# Patient Record
Sex: FEMALE | Race: WHITE | NOT HISPANIC OR LATINO | URBAN - METROPOLITAN AREA
[De-identification: names, ages, dates, MRNs, and addresses within clinical notes are randomized per-mention and may not be internally consistent; named-entity substitution may affect disease eponyms.]

---

## 2022-11-01 ENCOUNTER — EMERGENCY (EMERGENCY)
Facility: HOSPITAL | Age: 37
LOS: 1 days | Discharge: HOME | End: 2022-11-01
Attending: EMERGENCY MEDICINE | Admitting: EMERGENCY MEDICINE
Payer: SELF-PAY

## 2022-11-01 VITALS
HEART RATE: 98 BPM | SYSTOLIC BLOOD PRESSURE: 118 MMHG | OXYGEN SATURATION: 98 % | TEMPERATURE: 101 F | RESPIRATION RATE: 16 BRPM | HEIGHT: 69 IN | WEIGHT: 194.01 LBS | DIASTOLIC BLOOD PRESSURE: 79 MMHG

## 2022-11-01 DIAGNOSIS — B37.9 CANDIDIASIS, UNSPECIFIED: ICD-10-CM

## 2022-11-01 DIAGNOSIS — Z88.0 ALLERGY STATUS TO PENICILLIN: ICD-10-CM

## 2022-11-01 DIAGNOSIS — Z87.440 PERSONAL HISTORY OF URINARY (TRACT) INFECTIONS: ICD-10-CM

## 2022-11-01 DIAGNOSIS — Z86.19 PERSONAL HISTORY OF OTHER INFECTIOUS AND PARASITIC DISEASES: ICD-10-CM

## 2022-11-01 DIAGNOSIS — Z20.822 CONTACT WITH AND (SUSPECTED) EXPOSURE TO COVID-19: ICD-10-CM

## 2022-11-01 DIAGNOSIS — N39.0 URINARY TRACT INFECTION, SITE NOT SPECIFIED: ICD-10-CM

## 2022-11-01 DIAGNOSIS — A60.00 HERPESVIRAL INFECTION OF UROGENITAL SYSTEM, UNSPECIFIED: ICD-10-CM

## 2022-11-01 DIAGNOSIS — R30.0 DYSURIA: ICD-10-CM

## 2022-11-01 DIAGNOSIS — Z79.85 LONG-TERM (CURRENT) USE OF INJECTABLE NON-INSULIN ANTIDIABETIC DRUGS: ICD-10-CM

## 2022-11-01 LAB
ALBUMIN SERPL ELPH-MCNC: 4.8 G/DL — SIGNIFICANT CHANGE UP (ref 3.3–5)
ALP SERPL-CCNC: 134 U/L — HIGH (ref 40–120)
ALT FLD-CCNC: 179 U/L — HIGH (ref 10–45)
ANION GAP SERPL CALC-SCNC: 15 MMOL/L — SIGNIFICANT CHANGE UP (ref 5–17)
APPEARANCE UR: CLEAR — SIGNIFICANT CHANGE UP
APTT BLD: 32.3 SEC — SIGNIFICANT CHANGE UP (ref 27.5–35.5)
AST SERPL-CCNC: 408 U/L — HIGH (ref 10–40)
BASOPHILS # BLD AUTO: 0.02 K/UL — SIGNIFICANT CHANGE UP (ref 0–0.2)
BASOPHILS NFR BLD AUTO: 0.2 % — SIGNIFICANT CHANGE UP (ref 0–2)
BILIRUB SERPL-MCNC: 0.6 MG/DL — SIGNIFICANT CHANGE UP (ref 0.2–1.2)
BILIRUB UR-MCNC: NEGATIVE — SIGNIFICANT CHANGE UP
BUN SERPL-MCNC: 7 MG/DL — SIGNIFICANT CHANGE UP (ref 7–23)
CALCIUM SERPL-MCNC: 9.7 MG/DL — SIGNIFICANT CHANGE UP (ref 8.4–10.5)
CHLORIDE SERPL-SCNC: 96 MMOL/L — SIGNIFICANT CHANGE UP (ref 96–108)
CO2 SERPL-SCNC: 27 MMOL/L — SIGNIFICANT CHANGE UP (ref 22–31)
COLOR SPEC: YELLOW — SIGNIFICANT CHANGE UP
CREAT SERPL-MCNC: 0.62 MG/DL — SIGNIFICANT CHANGE UP (ref 0.5–1.3)
DIFF PNL FLD: ABNORMAL
EGFR: 118 ML/MIN/1.73M2 — SIGNIFICANT CHANGE UP
EOSINOPHIL # BLD AUTO: 0.01 K/UL — SIGNIFICANT CHANGE UP (ref 0–0.5)
EOSINOPHIL NFR BLD AUTO: 0.1 % — SIGNIFICANT CHANGE UP (ref 0–6)
GLUCOSE SERPL-MCNC: 128 MG/DL — HIGH (ref 70–99)
GLUCOSE UR QL: NEGATIVE — SIGNIFICANT CHANGE UP
HCG UR QL: NEGATIVE — SIGNIFICANT CHANGE UP
HCT VFR BLD CALC: 40 % — SIGNIFICANT CHANGE UP (ref 34.5–45)
HGB BLD-MCNC: 13.5 G/DL — SIGNIFICANT CHANGE UP (ref 11.5–15.5)
HIV 1+2 AB+HIV1 P24 AG SERPL QL IA: SIGNIFICANT CHANGE UP
IMM GRANULOCYTES NFR BLD AUTO: 0.4 % — SIGNIFICANT CHANGE UP (ref 0–0.9)
INR BLD: 1.17 — HIGH (ref 0.88–1.16)
KETONES UR-MCNC: ABNORMAL MG/DL
LEUKOCYTE ESTERASE UR-ACNC: ABNORMAL
LYMPHOCYTES # BLD AUTO: 1.6 K/UL — SIGNIFICANT CHANGE UP (ref 1–3.3)
LYMPHOCYTES # BLD AUTO: 19.2 % — SIGNIFICANT CHANGE UP (ref 13–44)
MCHC RBC-ENTMCNC: 32.1 PG — SIGNIFICANT CHANGE UP (ref 27–34)
MCHC RBC-ENTMCNC: 33.8 GM/DL — SIGNIFICANT CHANGE UP (ref 32–36)
MCV RBC AUTO: 95 FL — SIGNIFICANT CHANGE UP (ref 80–100)
MONOCYTES # BLD AUTO: 0.91 K/UL — HIGH (ref 0–0.9)
MONOCYTES NFR BLD AUTO: 10.9 % — SIGNIFICANT CHANGE UP (ref 2–14)
NEUTROPHILS # BLD AUTO: 5.78 K/UL — SIGNIFICANT CHANGE UP (ref 1.8–7.4)
NEUTROPHILS NFR BLD AUTO: 69.2 % — SIGNIFICANT CHANGE UP (ref 43–77)
NITRITE UR-MCNC: NEGATIVE — SIGNIFICANT CHANGE UP
NRBC # BLD: 0 /100 WBCS — SIGNIFICANT CHANGE UP (ref 0–0)
PH UR: 6 — SIGNIFICANT CHANGE UP (ref 5–8)
PLATELET # BLD AUTO: 162 K/UL — SIGNIFICANT CHANGE UP (ref 150–400)
POTASSIUM SERPL-MCNC: 3.9 MMOL/L — SIGNIFICANT CHANGE UP (ref 3.5–5.3)
POTASSIUM SERPL-SCNC: 3.9 MMOL/L — SIGNIFICANT CHANGE UP (ref 3.5–5.3)
PROT SERPL-MCNC: 8 G/DL — SIGNIFICANT CHANGE UP (ref 6–8.3)
PROT UR-MCNC: 30 MG/DL
PROTHROM AB SERPL-ACNC: 14 SEC — HIGH (ref 10.5–13.4)
RBC # BLD: 4.21 M/UL — SIGNIFICANT CHANGE UP (ref 3.8–5.2)
RBC # FLD: 13.2 % — SIGNIFICANT CHANGE UP (ref 10.3–14.5)
SARS-COV-2 RNA SPEC QL NAA+PROBE: NEGATIVE — SIGNIFICANT CHANGE UP
SODIUM SERPL-SCNC: 138 MMOL/L — SIGNIFICANT CHANGE UP (ref 135–145)
SP GR SPEC: >=1.03 — SIGNIFICANT CHANGE UP (ref 1–1.03)
UROBILINOGEN FLD QL: 0.2 E.U./DL — SIGNIFICANT CHANGE UP
WBC # BLD: 8.35 K/UL — SIGNIFICANT CHANGE UP (ref 3.8–10.5)
WBC # FLD AUTO: 8.35 K/UL — SIGNIFICANT CHANGE UP (ref 3.8–10.5)

## 2022-11-01 PROCEDURE — 74176 CT ABD & PELVIS W/O CONTRAST: CPT | Mod: MA

## 2022-11-01 PROCEDURE — 85025 COMPLETE CBC W/AUTO DIFF WBC: CPT

## 2022-11-01 PROCEDURE — 99284 EMERGENCY DEPT VISIT MOD MDM: CPT | Mod: 25

## 2022-11-01 PROCEDURE — 87086 URINE CULTURE/COLONY COUNT: CPT

## 2022-11-01 PROCEDURE — 87591 N.GONORRHOEAE DNA AMP PROB: CPT

## 2022-11-01 PROCEDURE — 36415 COLL VENOUS BLD VENIPUNCTURE: CPT

## 2022-11-01 PROCEDURE — 96374 THER/PROPH/DIAG INJ IV PUSH: CPT

## 2022-11-01 PROCEDURE — 85610 PROTHROMBIN TIME: CPT

## 2022-11-01 PROCEDURE — 80074 ACUTE HEPATITIS PANEL: CPT

## 2022-11-01 PROCEDURE — 86780 TREPONEMA PALLIDUM: CPT

## 2022-11-01 PROCEDURE — 85730 THROMBOPLASTIN TIME PARTIAL: CPT

## 2022-11-01 PROCEDURE — 87389 HIV-1 AG W/HIV-1&-2 AB AG IA: CPT

## 2022-11-01 PROCEDURE — 81025 URINE PREGNANCY TEST: CPT

## 2022-11-01 PROCEDURE — 80053 COMPREHEN METABOLIC PANEL: CPT

## 2022-11-01 PROCEDURE — 81001 URINALYSIS AUTO W/SCOPE: CPT

## 2022-11-01 PROCEDURE — 99285 EMERGENCY DEPT VISIT HI MDM: CPT

## 2022-11-01 PROCEDURE — 87491 CHLMYD TRACH DNA AMP PROBE: CPT

## 2022-11-01 PROCEDURE — 96375 TX/PRO/DX INJ NEW DRUG ADDON: CPT

## 2022-11-01 PROCEDURE — 87635 SARS-COV-2 COVID-19 AMP PRB: CPT

## 2022-11-01 PROCEDURE — 74176 CT ABD & PELVIS W/O CONTRAST: CPT | Mod: 26,MA

## 2022-11-01 RX ORDER — CEFTRIAXONE 500 MG/1
1000 INJECTION, POWDER, FOR SOLUTION INTRAMUSCULAR; INTRAVENOUS ONCE
Refills: 0 | Status: COMPLETED | OUTPATIENT
Start: 2022-11-01 | End: 2022-11-01

## 2022-11-01 RX ORDER — CEFPODOXIME PROXETIL 100 MG
1 TABLET ORAL
Qty: 20 | Refills: 0
Start: 2022-11-01 | End: 2022-11-10

## 2022-11-01 RX ORDER — KETOROLAC TROMETHAMINE 30 MG/ML
15 SYRINGE (ML) INJECTION ONCE
Refills: 0 | Status: DISCONTINUED | OUTPATIENT
Start: 2022-11-01 | End: 2022-11-01

## 2022-11-01 RX ORDER — ACETAMINOPHEN 500 MG
650 TABLET ORAL ONCE
Refills: 0 | Status: COMPLETED | OUTPATIENT
Start: 2022-11-01 | End: 2022-11-01

## 2022-11-01 RX ORDER — SODIUM CHLORIDE 9 MG/ML
1000 INJECTION INTRAMUSCULAR; INTRAVENOUS; SUBCUTANEOUS ONCE
Refills: 0 | Status: COMPLETED | OUTPATIENT
Start: 2022-11-01 | End: 2022-11-01

## 2022-11-01 RX ORDER — FLUCONAZOLE 150 MG/1
150 TABLET ORAL ONCE
Refills: 0 | Status: COMPLETED | OUTPATIENT
Start: 2022-11-01 | End: 2022-11-01

## 2022-11-01 RX ORDER — VALACYCLOVIR 500 MG/1
1000 TABLET, FILM COATED ORAL ONCE
Refills: 0 | Status: COMPLETED | OUTPATIENT
Start: 2022-11-01 | End: 2022-11-01

## 2022-11-01 RX ORDER — VALACYCLOVIR 500 MG/1
1 TABLET, FILM COATED ORAL
Qty: 20 | Refills: 0
Start: 2022-11-01 | End: 2022-11-10

## 2022-11-01 RX ADMIN — FLUCONAZOLE 150 MILLIGRAM(S): 150 TABLET ORAL at 22:20

## 2022-11-01 RX ADMIN — CEFTRIAXONE 100 MILLIGRAM(S): 500 INJECTION, POWDER, FOR SOLUTION INTRAMUSCULAR; INTRAVENOUS at 22:47

## 2022-11-01 RX ADMIN — Medication 650 MILLIGRAM(S): at 20:34

## 2022-11-01 RX ADMIN — VALACYCLOVIR 1000 MILLIGRAM(S): 500 TABLET, FILM COATED ORAL at 22:47

## 2022-11-01 RX ADMIN — Medication 15 MILLIGRAM(S): at 22:20

## 2022-11-01 RX ADMIN — SODIUM CHLORIDE 1000 MILLILITER(S): 9 INJECTION INTRAMUSCULAR; INTRAVENOUS; SUBCUTANEOUS at 20:34

## 2022-11-01 RX ADMIN — Medication 15 MILLIGRAM(S): at 23:00

## 2022-11-01 NOTE — ED ADULT NURSE NOTE - OBJECTIVE STATEMENT
Pt A&Ox4, ambulatory with steady gait, speaking in clear/full sentences, no acute distress, vital signs stable. Pt presents to the ED for urinary burning, "cottage cheese appearing vaginal discharge" and genital burning and pain. Pt also reports general malaise, loss of appetite and some fever at home. Pt also reports drinking "too much." Unable to state how much she drinks per week.

## 2022-11-01 NOTE — ED ADULT TRIAGE NOTE - CHIEF COMPLAINT QUOTE
Pt presents with multiple medical complaints. Reports she is currently being treated for a UTI, on antibiotics, and has been "peeing blood" x3 days. Also reports "lumps" to vaginal area, white discharge, and "too much malaise for me." Pt also states she now has pain "near my liver." Reports she has not eaten in two days. Pt speaking in full sentences.

## 2022-11-02 VITALS
DIASTOLIC BLOOD PRESSURE: 87 MMHG | SYSTOLIC BLOOD PRESSURE: 122 MMHG | HEART RATE: 82 BPM | TEMPERATURE: 99 F | RESPIRATION RATE: 17 BRPM | OXYGEN SATURATION: 99 %

## 2022-11-02 LAB
C TRACH RRNA SPEC QL NAA+PROBE: SIGNIFICANT CHANGE UP
N GONORRHOEA RRNA SPEC QL NAA+PROBE: SIGNIFICANT CHANGE UP
SPECIMEN SOURCE: SIGNIFICANT CHANGE UP
T PALLIDUM AB TITR SER: NEGATIVE — SIGNIFICANT CHANGE UP

## 2022-11-02 NOTE — ED PROVIDER NOTE - OBJECTIVE STATEMENT
37F with a h/o UTIs and yeast infections in the past, on suboxone, who p/w dysuria and blood in urine x 3 days, Reports recent unprotected intercourse and noted in whitish cottage cheese dc and painful "bumps" to vaginal area. Also reports flank pain and feels like her liver is enlarged. She recently started taking a weight loss injectable Mounjaro  Admits to daily alcohol use, no hx of withdrawals 37F with a h/o UTIs and yeast infections in the past, on suboxone, who p/w dysuria and blood in urine x 3 days, Reports recent unprotected intercourse and noted in whitish cottage cheese dc and painful "bumps" to vaginal area. Also reports flank pain and feels like her liver is enlarged. She recently started taking a weight loss injectable Mounjaro  Admits to daily alcohol use, no hx of withdrawals. +chills, no F, no cp/sob, no dizziness or syncope, no n/t/w in ext. No other complaints.

## 2022-11-02 NOTE — ED PROVIDER NOTE - CLINICAL SUMMARY MEDICAL DECISION MAKING FREE TEXT BOX
37F with a h/o UTIs and yeast infections in the past, on suboxone, who p/w dysuria and blood in urine x 3 days, Reports recent unprotected intercourse and noted in whitish cottage cheese dc and painful "bumps" to vaginal area. Also reports flank pain and feels like her liver is enlarged. She recently started taking a weight loss injectable Mounjaro  Admits to daily alcohol use, no hx of withdrawals. +chills, no F, no cp/sob, no dizziness or syncope, no n/t/w in ext. No other complaints.  Pt is anxious and uncomfortable appearing on exam, VSS, no focal neuro deficits, abd nonsurgical however given hematuria and lfank pain will get labs, UA, CT a/p to r/o stone. Exam c/w yeast and herpes. clinically c/w hsv infection. Pt offered prophylactic STD tx but prefers to wait for results as she states he partner recently tested negative. UA +for infectipn, confirmed by CT, no kidney stones.  Transaminitis noted - likely 2/2 etoh use, less likely Suboxone. Pt denies hx of etoh w/d, she plans to seek treatment and will follow up with PMD. Pt was treated for yeast, herpes and UTI. She feels much better after treatment in the ER. Her PMD Dr. Sands was notified and will follow up.   Pt feeling improved and is stable for DC. ED evaluation and management discussed with the patient in detail.  Close PMD follow up encouraged.  Strict ED return instructions discussed in detail and patient given the opportunity to ask any questions about their discharge diagnosis and instructions. Patient verbalized understanding.

## 2022-11-02 NOTE — ED PROVIDER NOTE - PATIENT PORTAL LINK FT
You can access the FollowMyHealth Patient Portal offered by [facility] by registering at the following website: [email or web address]. By joining [facility]’s FollowMyHealth portal, you will also be able to view your health information using other applications (apps) compatible with our system.

## 2022-11-02 NOTE — ED PROVIDER NOTE - PHYSICAL EXAMINATION
GEN: Well appearing, well developed, awake, alert, oriented to person, place, time/situation, anxious but otherwisein no apparent distress. NTAF  ENT: Airway patent, Nasal mucosa clear. Mouth with normal mucosa.  EYES: Clear bilaterally. PERRL, EOMI  RESPIRATORY: Breathing comfortably with normal RR. No W/C/R, no hypoxia or resp distress.  CARDIAC: Regular rate and rhythm, no M/R/G  ABDOMEN: Soft, nontender, +bowel sounds, no rebound, rigidity, or guarding.  : painful vesicular lesions noted around vaginal, erythematous vaginal vault with whitish cottage cheese DC, no CMT or adnexal masses or TTP, no vaginal bleeding.   MSK: Range of motion is not limited, no deformities noted.  NEURO: Alert and oriented, no focal deficits.  SKIN: Skin normal color for race, warm, dry and intact.  PSYCH: Alert and oriented to person, place, time/situation. anxious mood and affect. no apparent risk to self or others.

## 2022-11-02 NOTE — ED PROVIDER NOTE - CARE PLAN
1 Principal Discharge DX:	Acute UTI  Secondary Diagnosis:	Yeast infection  Secondary Diagnosis:	Genital herpes

## 2022-11-02 NOTE — ED PROVIDER NOTE - NSFOLLOWUPINSTRUCTIONS_ED_ALL_ED_FT
1. You were seen for a UTI, yeast infection, and genital herpes. A copy of any of your resulted labs, imaging, and findings have been provided to you. Make sure to view any test results that may not have yet resulted at the time of your discharge by creating a FollowMyHealth account at: [email or web address] to sign up for FollowMyHealth.   2. Please take antibiotics (cefpodoxime) for UTI. You were given an IV dose of ceftriaxone for UTI and did not experience an allergic reaction. Take Valtrex for herpes. Take fluconazole in one week if symptoms of yeast infection persist. You were given one dose already in the ER. Please follow up with your gynecologist. You will be called in 2 days if any of your culture results are positive.   3. Please follow up with your primary care physician, Dr. Sands for repeat testing of your liver function tests. Please seek help for alcohol misuse and discuss suboxone tapering with your primary care doctor.   4. Return to the emergency department for new, persistent, or worsening symptoms or signs, or for any concerning symptoms.   5. For your for health, you should make healthy food choices and be physically active. Also, you should not smoke or use drugs, and you should not drink alcohol in excess. Please visit [email or web address] for resources and more information.      Urinary Tract Infection    A urinary tract infection (UTI) is an infection of any part of the urinary tract, which includes the kidneys, ureters, bladder, and urethra. Risk factors include ignoring your need to urinate, wiping back to front if female, being an uncircumcised male, and having diabetes or a weak immune system. Symptoms include frequent urination, pain or burning with urination, foul smelling urine, cloudy urine, pain in the lower abdomen, blood in the urine, and fever. If you were prescribed an antibiotic medicine, take it as told by your health care provider. Do not stop taking the antibiotic even if you start to feel better.    SEEK IMMEDIATE MEDICAL CARE IF YOU HAVE ANY OF THE FOLLOWING SYMPTOMS: severe back or abdominal pain, fever, inability to keep fluids or medicine down, dizziness/lightheadedness, or a change in mental status.      Vaginal Yeast Infection, Adult    Female body with a close-up showing the vagina with a yeast infection.   Vaginal yeast infection is a condition that causes vaginal discharge as well as soreness, swelling, and redness (inflammation) of the vagina. This is a common condition. Some women get this infection frequently.      What are the causes?    This condition is caused by a change in the normal balance of the yeast (Candida) and normal bacteria that live in the vagina. This change causes an overgrowth of yeast, which causes the inflammation.      What increases the risk?    The condition is more likely to develop in women who:  •Take antibiotic medicines.      •Have diabetes.      •Take birth control pills.      •Are pregnant.      •Douche often.      •Have a weak body defense system (immune system).      •Have been taking steroid medicines for a long time.      •Frequently wear tight clothing.        What are the signs or symptoms?    Symptoms of this condition include:  •White, thick, creamy vaginal discharge.      •Swelling, itching, redness, and irritation of the vagina. The lips of the vagina (labia) may be affected as well.      •Pain or a burning feeling while urinating.      •Pain during sex.        How is this diagnosed?    This condition is diagnosed based on:  •Your medical history.      •A physical exam.      •A pelvic exam. Your health care provider will examine a sample of your vaginal discharge under a microscope. Your health care provider may send this sample for testing to confirm the diagnosis.        How is this treated?    This condition is treated with medicine. Medicines may be over-the-counter or prescription. You may be told to use one or more of the following:  •Medicine that is taken by mouth (orally).      •Medicine that is applied as a cream (topically).      •Medicine that is inserted directly into the vagina (suppository).        Follow these instructions at home:    •Take or apply over-the-counter and prescription medicines only as told by your health care provider.      • Do not use tampons until your health care provider approves.      • Do not have sex until your infection has cleared. Sex can prolong or worsen your symptoms of infection. Ask your health care provider when it is safe to resume sexual activity.      •Keep all follow-up visits. This is important.        How is this prevented?  A sign showing that a person should not douche.   • Do not wear tight clothes, such as pantyhose or tight pants.      •Wear breathable cotton underwear.      • Do not use douches, perfumed soap, creams, or powders.      •Wipe from front to back after using the toilet.      •If you have diabetes, keep your blood sugar levels under control.      •Ask your health care provider for other ways to prevent yeast infections.        Contact a health care provider if:    •You have a fever.      •Your symptoms go away and then return.      •Your symptoms do not get better with treatment.      •Your symptoms get worse.      •You have new symptoms.      •You develop blisters in or around your vagina.      •You have blood coming from your vagina and it is not your menstrual period.      •You develop pain in your abdomen.        Summary    •Vaginal yeast infection is a condition that causes discharge as well as soreness, swelling, and redness (inflammation) of the vagina.      •This condition is treated with medicine. Medicines may be over-the-counter or prescription.      •Take or apply over-the-counter and prescription medicines only as told by your health care provider.      • Do not douche. Resume sexual activity or use of tampons as instructed by your health care provider.      •Contact a health care provider if your symptoms do not get better with treatment or your symptoms go away and then return.      This information is not intended to replace advice given to you by your health care provider. Make sure you discuss any questions you have with your health care provider.    Genital Herpes      Genital herpes is a common sexually transmitted infection (STI) that is caused by a virus. The virus spreads from person to person through sexual contact. The infection can cause itching, blisters, and sores around the genitals or rectum. Symptoms may last for several days and then go away. This is called an outbreak. The virus remains in the body, however, so more outbreaks may happen in the future. The time between outbreaks varies and can be from months to years.    Genital herpes can affect anyone. It is particularly concerning for pregnant women because the virus can be passed to the baby during delivery and can cause serious problems. Genital herpes is also a concern for people who have a weak disease-fighting system (immune system).      What are the causes?    This condition is caused by the human herpesvirus, also called herpes simplex virus, type 1 or type 2 (HSV-1 or HSV-2). The virus may spread through:  •Sexual contact with an infected person, including vaginal, anal, and oral sex.      •Contact with fluid from a herpes sore.      •The skin. This means that you can get herpes from an infected partner even if there are no blisters or sores present. Your partner may not know that he or she is infected.        What increases the risk?    You are more likely to develop this condition if:  •You have sex with many partners.      •You do not use latex condoms during sex.        What are the signs or symptoms?     Most people do not have symptoms (are asymptomatic), or they have mild symptoms that may be mistaken for other skin problems. Symptoms may include:  •Small, red bumps near the genitals, rectum, or mouth. These bumps turn into blisters and then sores.    •Flu-like symptoms, including:  •Fever.      •Body aches.      •Swollen lymph nodes.      •Headache.        •Painful urination.      •Pain and itching in the genital area or rectal area.      •Vaginal discharge.      •Tingling or shooting pain in the legs and buttocks.      Generally, symptoms are more severe and last longer during the first (primary) outbreak. Flu-like symptoms are also more common during the primary outbreak.      How is this diagnosed?    This condition may be diagnosed based on:  •A physical exam.      •Your medical history.      •Blood tests.      •A test of a fluid sample (culture) from an open sore.        How is this treated?    There is no cure for this condition, but treatment with antiviral medicines that are taken by mouth (orally) can do the following:  •Speed up healing and relieve symptoms.      •Help to reduce the spread of the virus to sexual partners.      •Limit the chance of future outbreaks, or make future outbreaks shorter.      •Lessen symptoms of future outbreaks.      Your health care provider may also recommend pain relief medicines, such as aspirin or ibuprofen.      Follow these instructions at home:    If you have an outbreak:     •Keep the affected areas dry and clean.    •Avoid rubbing or touching blisters and sores. If you do touch blisters or sores:  •Wash your hands thoroughly with soap and water.      •Do not touch your eyes afterward.      •To help relieve pain or itching, you may take the following actions as told by your health care provider:  •Apply a cold, wet cloth (cold compress) to affected areas 4–6 times a day.      •Apply a substance that protects your skin and reduces bleeding (astringent).      •Apply a gel that helps relieve pain around sores (lidocaine gel).      •Take a warm, shallow bath that cleans the genital area (sitz bath).        Sexual activity     • Do not have sexual contact during active outbreaks.      •Practice safe sex. Herpes can spread even if your partner does not have blisters or sores. Latex condoms and female condoms may help prevent the spread of the herpes virus.      General instructions     •Take over-the-counter and prescription medicines only as told by your health care provider.      •Keep all follow-up visits as told by your health care provider. This is important.        How is this prevented?    •Use condoms. Although you can get genital herpes during sexual contact even with the use of a condom, a condom can provide some protection.      •Avoid having multiple sexual partners.      •Talk with your sexual partner about any symptoms either of you may have. Also, talk with your partner about any history of STIs.      •Get tested for STIs before you have sex. Ask your partner to do the same.      • Do not have sexual contact if you have active symptoms of genital herpes.        Contact a health care provider if:    •Your symptoms are not improving with medicine.      •Your symptoms return, or you have new symptoms.      •You have a fever.      •You have abdominal pain.      •You have redness, swelling, or pain in your eye.      •You notice new sores on other parts of your body.      •You are a woman and you experience bleeding between menstrual periods.      •You have had herpes and you become pregnant or plan to become pregnant.        Summary    •Genital herpes is a common sexually transmitted infection (STI) that is caused by the herpes simplex virus, type 1 or type 2 (HSV-1 or HSV-2).      •These viruses are most often spread through sexual contact with an infected person.      •You are more likely to develop this condition if you have sex with many partners or you do not use condoms during sex.      •Most people do not have symptoms (are asymptomatic) or have mild symptoms that may be mistaken for other skin problems. Symptoms occur as outbreaks that may happen months or years apart.      •There is no cure for this condition, but treatment with oral antiviral medicines can reduce symptoms, reduce the chance of spreading the virus to a partner, prevent future outbreaks, or shorten future outbreaks.      This information is not intended to replace advice given to you by your health care provider. Make sure you discuss any questions you have with your health care provider.

## 2022-11-03 LAB
CULTURE RESULTS: SIGNIFICANT CHANGE UP
SPECIMEN SOURCE: SIGNIFICANT CHANGE UP

## 2022-11-07 RX ORDER — FLUCONAZOLE 150 MG/1
1 TABLET ORAL
Qty: 1 | Refills: 0
Start: 2022-11-07